# Patient Record
Sex: FEMALE | Race: WHITE | Employment: UNEMPLOYED | URBAN - METROPOLITAN AREA
[De-identification: names, ages, dates, MRNs, and addresses within clinical notes are randomized per-mention and may not be internally consistent; named-entity substitution may affect disease eponyms.]

---
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
## 2024-02-27 LAB
HIV 1+2 AB+HIV1 P24 AG SERPL QL IA: NON REACTIVE
RPR SER QL: NON REACTIVE
TSH SERPL DL<=0.005 MIU/L-ACNC: 18.9 UIU/ML (ref 0.45–4.5)

## 2024-02-27 SDOH — ECONOMIC STABILITY: FOOD INSECURITY: WITHIN THE PAST 12 MONTHS, THE FOOD YOU BOUGHT JUST DIDN'T LAST AND YOU DIDN'T HAVE MONEY TO GET MORE.: NEVER TRUE

## 2024-02-27 SDOH — ECONOMIC STABILITY: FOOD INSECURITY: WITHIN THE PAST 12 MONTHS, YOU WORRIED THAT YOUR FOOD WOULD RUN OUT BEFORE YOU GOT MONEY TO BUY MORE.: NEVER TRUE

## 2024-02-27 SDOH — ECONOMIC STABILITY: INCOME INSECURITY: HOW HARD IS IT FOR YOU TO PAY FOR THE VERY BASICS LIKE FOOD, HOUSING, MEDICAL CARE, AND HEATING?: NOT HARD AT ALL

## 2024-02-27 ASSESSMENT — PATIENT HEALTH QUESTIONNAIRE - PHQ9
SUM OF ALL RESPONSES TO PHQ QUESTIONS 1-9: 0
SUM OF ALL RESPONSES TO PHQ QUESTIONS 1-9: 0
2. FEELING DOWN, DEPRESSED OR HOPELESS: 0
SUM OF ALL RESPONSES TO PHQ9 QUESTIONS 1 & 2: 0
1. LITTLE INTEREST OR PLEASURE IN DOING THINGS: 0
SUM OF ALL RESPONSES TO PHQ QUESTIONS 1-9: 0

## 2024-02-27 NOTE — PROGRESS NOTES
Chief Complaint   Patient presents with    Follow-up     1. Have you been to the ER, urgent care clinic since your last visit?  Hospitalized since your last visit?No    2. Have you seen or consulted any other health care providers outside of the [facility] since your last visit?  Include any pap smears or colon screening. No

## 2024-02-28 ENCOUNTER — TELEMEDICINE (OUTPATIENT)
Facility: CLINIC | Age: 19
End: 2024-02-28
Payer: COMMERCIAL

## 2024-02-28 DIAGNOSIS — T75.3XXA CAR SICKNESS, INITIAL ENCOUNTER: ICD-10-CM

## 2024-02-28 DIAGNOSIS — E89.0 POSTPROCEDURAL HYPOTHYROIDISM: ICD-10-CM

## 2024-02-28 DIAGNOSIS — F41.8 OTHER SPECIFIED ANXIETY DISORDERS: ICD-10-CM

## 2024-02-28 DIAGNOSIS — E89.0 POSTPROCEDURAL HYPOTHYROIDISM: Primary | ICD-10-CM

## 2024-02-28 DIAGNOSIS — Z91.018 FOOD ALLERGY: ICD-10-CM

## 2024-02-28 LAB
HAV IGM SERPL QL IA: NEGATIVE
HBV CORE IGM SERPL QL IA: NEGATIVE
HBV SURFACE AG SERPL QL IA: NEGATIVE
HCV AB SERPL QL IA: NORMAL
HCV IGG SERPL QL IA: NON REACTIVE

## 2024-02-28 PROCEDURE — 99214 OFFICE O/P EST MOD 30 MIN: CPT | Performed by: FAMILY MEDICINE

## 2024-02-28 RX ORDER — LEVOTHYROXINE SODIUM 137 UG/1
125 TABLET ORAL
Qty: 90 TABLET | Refills: 1 | Status: SHIPPED | OUTPATIENT
Start: 2024-02-28

## 2024-02-28 RX ORDER — ONDANSETRON 4 MG/1
4 TABLET, ORALLY DISINTEGRATING ORAL 3 TIMES DAILY PRN
Qty: 21 TABLET | Refills: 0 | Status: SHIPPED | OUTPATIENT
Start: 2024-02-28

## 2024-02-28 RX ORDER — MECLIZINE HYDROCHLORIDE 25 MG/1
25 TABLET ORAL 3 TIMES DAILY PRN
Qty: 30 TABLET | Refills: 3 | Status: SHIPPED | OUTPATIENT
Start: 2024-02-28

## 2024-02-28 RX ORDER — ESCITALOPRAM OXALATE 10 MG/1
10 TABLET ORAL DAILY
Qty: 90 TABLET | Refills: 3 | Status: SHIPPED | OUTPATIENT
Start: 2024-02-28

## 2024-02-28 NOTE — PATIENT INSTRUCTIONS
At this time patient has 112mcg at home of LT4  Not consistent with taking medication  She tells me she can't always remember to take it  *set reminder    For now:  Start by taking 2 on Sunday and 1 on all other days Monday through Saturday    Recheck your thyroid test in about 1 mo      MECLIZINE: take for car sickness  Might cause sedation    ZOFRAN take for nausea/vomiting re/to car sickness    Recheck thyroid in about 25 days

## 2024-02-28 NOTE — PROGRESS NOTES
Roxi Rios is a 18 y.o. female who was seen by synchronous (real-time) audio-video technology on 2/28/2024.      Consent: Roxi Rios, who was seen by synchronous (real-time) audio-video technology, and/or her healthcare decision maker, is aware that this patient-initiated, Telehealth encounter on 2/28/2024 is a billable service, with coverage as determined by her insurance carrier. She is aware that she may receive a bill and has provided verbal consent to proceed: Yes.    Assessment & Plan:      Diagnosis Orders   1. Postprocedural hypothyroidism  levothyroxine (SYNTHROID) 137 MCG tablet    TSH      2. Other specified anxiety disorders  escitalopram (LEXAPRO) 10 MG tablet      3. Car sickness, initial encounter        4. Food allergy  Amb External Referral To Allergy        Patient Instructions   At this time patient has 112mcg at home of LT4  Not consistent with taking medication  She tells me she can't always remember to take it  *set reminder    For now:  Start by taking 2 on Sunday and 1 on all other days Monday through Saturday    Recheck your thyroid test in about 1 mo      MECLIZINE: take for car sickness  Might cause sedation    ZOFRAN take for nausea/vomiting re/to car sickness    Recheck thyroid in about 25 days          Pt was counseled on risks, benefits and alternatives of treatment options. All questions were asked and answered and the patient was agreeable with the treatment plan as outlined.      Subjective:   Roxi Rios is a 18 y.o. female who was seen for Follow-up      THYROID:   Not taking thyroid medication consistently  Forgetting to take it she tells me    Car sickness--better if she is driving  She is not pregnant (on period now)    Having some headaches  Having some fruit allergies--getting itchy mouth        Medications, allergies, PMH, PSH, SOCH, FAMH reviewed and updated per routine protocol, see chart for review and changes if not noted here.    Review of Systems    A

## 2024-04-11 DIAGNOSIS — E89.0 POSTPROCEDURAL HYPOTHYROIDISM: ICD-10-CM

## 2024-04-12 DIAGNOSIS — E89.0 POSTPROCEDURAL HYPOTHYROIDISM: ICD-10-CM

## 2024-04-12 DIAGNOSIS — E89.0 POSTPROCEDURAL HYPOTHYROIDISM: Primary | ICD-10-CM

## 2024-04-12 RX ORDER — LEVOTHYROXINE SODIUM 137 UG/1
125 TABLET ORAL
Qty: 30 TABLET | Refills: 0 | Status: SHIPPED | OUTPATIENT
Start: 2024-04-12

## 2024-06-12 ENCOUNTER — OFFICE VISIT (OUTPATIENT)
Facility: CLINIC | Age: 19
End: 2024-06-12
Payer: COMMERCIAL

## 2024-06-12 VITALS
HEIGHT: 64 IN | TEMPERATURE: 98.2 F | BODY MASS INDEX: 23.15 KG/M2 | DIASTOLIC BLOOD PRESSURE: 67 MMHG | SYSTOLIC BLOOD PRESSURE: 97 MMHG | WEIGHT: 135.6 LBS | OXYGEN SATURATION: 97 % | RESPIRATION RATE: 16 BRPM | HEART RATE: 64 BPM

## 2024-06-12 DIAGNOSIS — Z11.3 SCREENING EXAMINATION FOR VENEREAL DISEASE: ICD-10-CM

## 2024-06-12 DIAGNOSIS — F39 MOOD DISORDER (HCC): ICD-10-CM

## 2024-06-12 DIAGNOSIS — Z00.01 ENCOUNTER FOR GENERAL ADULT MEDICAL EXAMINATION WITH ABNORMAL FINDINGS: Primary | ICD-10-CM

## 2024-06-12 DIAGNOSIS — F33.0 MDD (MAJOR DEPRESSIVE DISORDER), RECURRENT EPISODE, MILD (HCC): ICD-10-CM

## 2024-06-12 DIAGNOSIS — E89.0 POSTPROCEDURAL HYPOTHYROIDISM: ICD-10-CM

## 2024-06-12 DIAGNOSIS — F41.8 OTHER SPECIFIED ANXIETY DISORDERS: ICD-10-CM

## 2024-06-12 PROCEDURE — 99395 PREV VISIT EST AGE 18-39: CPT | Performed by: FAMILY MEDICINE

## 2024-06-12 PROCEDURE — 99213 OFFICE O/P EST LOW 20 MIN: CPT | Performed by: FAMILY MEDICINE

## 2024-06-12 RX ORDER — ESCITALOPRAM OXALATE 10 MG/1
10 TABLET ORAL DAILY
Qty: 90 TABLET | Refills: 3 | Status: SHIPPED | OUTPATIENT
Start: 2024-06-12

## 2024-06-12 SDOH — HEALTH STABILITY: PHYSICAL HEALTH: ON AVERAGE, HOW MANY MINUTES DO YOU ENGAGE IN EXERCISE AT THIS LEVEL?: 10 MIN

## 2024-06-12 SDOH — HEALTH STABILITY: PHYSICAL HEALTH: ON AVERAGE, HOW MANY DAYS PER WEEK DO YOU ENGAGE IN MODERATE TO STRENUOUS EXERCISE (LIKE A BRISK WALK)?: 2 DAYS

## 2024-06-12 ASSESSMENT — PATIENT HEALTH QUESTIONNAIRE - PHQ9
2. FEELING DOWN, DEPRESSED OR HOPELESS: MORE THAN HALF THE DAYS
7. TROUBLE CONCENTRATING ON THINGS, SUCH AS READING THE NEWSPAPER OR WATCHING TELEVISION: SEVERAL DAYS
3. TROUBLE FALLING OR STAYING ASLEEP: NEARLY EVERY DAY
8. MOVING OR SPEAKING SO SLOWLY THAT OTHER PEOPLE COULD HAVE NOTICED. OR THE OPPOSITE, BEING SO FIGETY OR RESTLESS THAT YOU HAVE BEEN MOVING AROUND A LOT MORE THAN USUAL: NOT AT ALL
9. THOUGHTS THAT YOU WOULD BE BETTER OFF DEAD, OR OF HURTING YOURSELF: SEVERAL DAYS
SUM OF ALL RESPONSES TO PHQ QUESTIONS 1-9: 17
1. LITTLE INTEREST OR PLEASURE IN DOING THINGS: NEARLY EVERY DAY
SUM OF ALL RESPONSES TO PHQ QUESTIONS 1-9: 18
SUM OF ALL RESPONSES TO PHQ QUESTIONS 1-9: 18
4. FEELING TIRED OR HAVING LITTLE ENERGY: NEARLY EVERY DAY
SUM OF ALL RESPONSES TO PHQ QUESTIONS 1-9: 18
6. FEELING BAD ABOUT YOURSELF - OR THAT YOU ARE A FAILURE OR HAVE LET YOURSELF OR YOUR FAMILY DOWN: MORE THAN HALF THE DAYS
SUM OF ALL RESPONSES TO PHQ9 QUESTIONS 1 & 2: 5

## 2024-06-12 ASSESSMENT — COLUMBIA-SUICIDE SEVERITY RATING SCALE - C-SSRS
2. HAVE YOU ACTUALLY HAD ANY THOUGHTS OF KILLING YOURSELF?: NO
1. WITHIN THE PAST MONTH, HAVE YOU WISHED YOU WERE DEAD OR WISHED YOU COULD GO TO SLEEP AND NOT WAKE UP?: YES
6. HAVE YOU EVER DONE ANYTHING, STARTED TO DO ANYTHING, OR PREPARED TO DO ANYTHING TO END YOUR LIFE?: NO

## 2024-06-12 NOTE — PATIENT INSTRUCTIONS
The 24/7 [place] [facility] Crisis line (458-932-0422) is available to assist with mental health emergencies occurring in the [facility]. If you, or someone you know, is having thoughts of suicide, feelings of hopelessness or experiencing any other psychiatric emergency, please call! They can assist in discussing ways to stay safe and/or can link individuals to outpatient treatment, crisis stabilization programs, or psychiatric hospitalization as needed.   All crisis calls are free. If you have a mental health emergency, please call [phone].   Regular client appointments and intakes for new clients are being conducted by telephone or through tele-health options. Individuals wishing to begin outpatient counseling services through [facility] can the Intake number at [phone].  -----------  Suicide Prevention Lifeline: (206) 273-TALK or (472) SUICIDE  ?  Mental Health [facility]: 511-672-RPRI 6428) - peer run M-F (non-crisis)    -----------    COUNSELOR OPTIONS:  [facility]- http://Marseille NetworksbeRed Ambiental.Focus IP/  860-497-4326    [facility] - https://www.Anago/  [phone]     [facility] - https://familyguidancecenters.com/services/  [phone]    [facility] - https://www.IndiaIdeas/  671.576.8686    [facility] psychiatry (*Slovak-speaking psychiatrist also) - https://www.Con[employer] Systemspsychiatric.com/   [phone]    [facility] - https://AltSchool/  [phone]    [facility] - https://[employer]/  803-295-9944    [facility]  [phone]    [facility]  [phone]

## 2024-06-12 NOTE — PROGRESS NOTES
Chief Complaint   Patient presents with    Annual Exam     Roxi Rios is a 19 y.o. female who presents for his/her annual physical examination. Their most recent physical exam was done over 1 yr ago.      \"Have you been to the ER, urgent care clinic since your last visit?  Hospitalized since your last visit?\"    NO    “Have you seen or consulted any other health care providers outside of [facility] since your last visit?”    NO             
in discussing ways to stay safe and/or can link individuals to outpatient treatment, crisis stabilization programs, or psychiatric hospitalization as needed.   All crisis calls are free. If you have a mental health emergency, please call [phone].   Regular client appointments and intakes for new clients are being conducted by telephone or through tele-health options. Individuals wishing to begin outpatient counseling services through [facility] can the Intake number at [phone].  -----------  Suicide Prevention Lifeline: (272) 273-TALK or (972) SUICIDE  ?  Mental Health [facility]: 692-582-TWVF (4209) - peer run M-F (non-crisis)    -----------    COUNSELOR OPTIONS:  [facility]- http://balancebehavioral.com/  870-598-7228    [facility] - https://wwwDreamLines/  [phone]     [facility] - https://familyguidancecenters.com/services/  [phone]    [facility] - https://www.Croak.it/  989-100-9000    [facility] psychiatry (*[place] Islander-speaking psychiatrist also) - https://www.Knox [employer] HubiatricToma Biosciences/   [phone]    [facility] - https://Shoppable/  [phone]    [facility] behavioral health - https://Edventures/  [phone]    [facility]  [phone]    [facility]  [phone]        I have discussed the diagnosis with the patient and the intended plan as seen in the above orders.  The patient understands and agrees with the plan. The patient has received an after-visit summary and questions were answered concerning future plans.     Medication Side Effects and Warnings were discussed with patient  Patient Labs were reviewed and or requested:  Patient Past Records were reviewed and or requested    Please note that this dictation was completed with [employer], the Raptr voice recognition software.  Quite often unanticipated grammatical, syntax, homophones, and other interpretive errors are inadvertently transcribed by

## 2024-07-10 ENCOUNTER — TELEMEDICINE (OUTPATIENT)
Facility: CLINIC | Age: 19
End: 2024-07-10
Payer: COMMERCIAL

## 2024-07-10 DIAGNOSIS — F39 MOOD DISORDER (HCC): Primary | ICD-10-CM

## 2024-07-10 DIAGNOSIS — E89.0 POSTPROCEDURAL HYPOTHYROIDISM: ICD-10-CM

## 2024-07-10 DIAGNOSIS — F33.0 MDD (MAJOR DEPRESSIVE DISORDER), RECURRENT EPISODE, MILD (HCC): ICD-10-CM

## 2024-07-10 PROCEDURE — 99213 OFFICE O/P EST LOW 20 MIN: CPT | Performed by: FAMILY MEDICINE

## 2024-07-10 NOTE — PATIENT INSTRUCTIONS
The 24/7 [place] [facility] Crisis line (681-891-7612) is available to assist with mental health emergencies occurring in the [facility]. If you, or someone you know, is having thoughts of suicide, feelings of hopelessness or experiencing any other psychiatric emergency, please call! They can assist in discussing ways to stay safe and/or can link individuals to outpatient treatment, crisis stabilization programs, or psychiatric hospitalization as needed.   All crisis calls are free. If you have a mental health emergency, please call [phone].   Regular client appointments and intakes for new clients are being conducted by telephone or through tele-health options. Individuals wishing to begin outpatient counseling services through [facility] can the Intake number at [phone].  -----------    Crisis Response and Stabilization Team (CReST) Referral Line [phone]  CReST is a resource for children ages 5 - 18 or adults ages 18 & older who are in crisis and need help avoiding psychiatric hospitalization.  With quick response and assistance connecting to on-going services, CReST works to reduce the cycles of crises and prevent the need for more intense care.    -----------    247 REACH CRISIS & REFERRAL LINE - 1-547.349.9716  REACH is a program to support individuals with developmental disabilities who are at risk of crisis due to challenging behavioral health needs which are negatively affecting their quality of life.    -----------    Teddy Project Hotline: 1-197.709.9021  The Teddy Project is the leading national organization providing crisis intervention and suicide prevention services to lesbian, tobias, bisexual, transgender, queer & questioning (LGBTQ) young people under 25.  ?  ASIYA: Text “ASIYA” to 962029 if you are having suicidal thoughts or urges. You can reach out by text.  ?  Suicide Prevention Lifeline: (368) 273-TALK or (416) SUICIDE  ?  Mental Health [facility]: 012-599-YMOF (8947) - peer

## 2024-07-10 NOTE — PROGRESS NOTES
Chief Complaint   Patient presents with    Anxiety     Roxi Rios is a 19 y.o. female who presents for a follow up on anxiety.    Patient was recently started on vraylar. She reports good compliance with med and denies any side effects.     \"Have you been to the ER, urgent care clinic since your last visit?  Hospitalized since your last visit?\"    NO    “Have you seen or consulted any other health care providers outside of [facility] since your last visit?”    NO             
Telephone Call RE:  Appointment reminder     Outcome:     [x] Patient confirmed appointment   [] Patient rescheduled appointment for    [] Unable to reach   [] Left message              [] Other:       Andrew Carlson
Abnormal -     Neurological:        [x] No Facial Asymmetry (Cranial nerve 7 motor function) (limited exam due to video visit)          [x] No gaze palsy        [] Abnormal -          Skin:        [x] No significant exanthematous lesions or discoloration noted on facial skin         [] Abnormal -            Psychiatric:       [x] Normal Affect [] Abnormal -        [x] No Hallucinations    Other pertinent observable physical exam findings:-    Results         I have discussed the diagnosis with the patient and the intended plan as seen in the above orders.  The patient understands and agrees with the plan.      Medication Side Effects and Warnings were discussed with patient  Patient Labs were reviewed and or requested:  Patient Past Records were reviewed and or requested    On this date 07/10/24 I have spent 15 minutes reviewing previous notes, test results and face to face (virtual) with the patient discussing the diagnosis and importance of compliance with the treatment plan as well as documenting on the day of the visit.    --ALIZA Ball - [facility]  [phone]

## 2024-09-12 ENCOUNTER — TELEMEDICINE (OUTPATIENT)
Facility: CLINIC | Age: 19
End: 2024-09-12
Payer: COMMERCIAL

## 2024-09-12 DIAGNOSIS — F33.0 MDD (MAJOR DEPRESSIVE DISORDER), RECURRENT EPISODE, MILD (HCC): ICD-10-CM

## 2024-09-12 DIAGNOSIS — F39 MOOD DISORDER (HCC): ICD-10-CM

## 2024-09-12 PROCEDURE — 99213 OFFICE O/P EST LOW 20 MIN: CPT | Performed by: FAMILY MEDICINE

## 2024-09-12 ASSESSMENT — PATIENT HEALTH QUESTIONNAIRE - PHQ9
SUM OF ALL RESPONSES TO PHQ QUESTIONS 1-9: 19
1. LITTLE INTEREST OR PLEASURE IN DOING THINGS: NEARLY EVERY DAY
SUM OF ALL RESPONSES TO PHQ9 QUESTIONS 1 & 2: 6
4. FEELING TIRED OR HAVING LITTLE ENERGY: NEARLY EVERY DAY
10. IF YOU CHECKED OFF ANY PROBLEMS, HOW DIFFICULT HAVE THESE PROBLEMS MADE IT FOR YOU TO DO YOUR WORK, TAKE CARE OF THINGS AT HOME, OR GET ALONG WITH OTHER PEOPLE: VERY DIFFICULT
6. FEELING BAD ABOUT YOURSELF - OR THAT YOU ARE A FAILURE OR HAVE LET YOURSELF OR YOUR FAMILY DOWN: NEARLY EVERY DAY
7. TROUBLE CONCENTRATING ON THINGS, SUCH AS READING THE NEWSPAPER OR WATCHING TELEVISION: SEVERAL DAYS
3. TROUBLE FALLING OR STAYING ASLEEP: SEVERAL DAYS
SUM OF ALL RESPONSES TO PHQ QUESTIONS 1-9: 16
9. THOUGHTS THAT YOU WOULD BE BETTER OFF DEAD, OR OF HURTING YOURSELF: NEARLY EVERY DAY
SUM OF ALL RESPONSES TO PHQ QUESTIONS 1-9: 19
SUM OF ALL RESPONSES TO PHQ QUESTIONS 1-9: 19
2. FEELING DOWN, DEPRESSED OR HOPELESS: NEARLY EVERY DAY
5. POOR APPETITE OR OVEREATING: SEVERAL DAYS
8. MOVING OR SPEAKING SO SLOWLY THAT OTHER PEOPLE COULD HAVE NOTICED. OR THE OPPOSITE, BEING SO FIGETY OR RESTLESS THAT YOU HAVE BEEN MOVING AROUND A LOT MORE THAN USUAL: SEVERAL DAYS

## 2024-09-12 ASSESSMENT — COLUMBIA-SUICIDE SEVERITY RATING SCALE - C-SSRS
4. HAVE YOU HAD THESE THOUGHTS AND HAD SOME INTENTION OF ACTING ON THEM?: NO
2. HAVE YOU ACTUALLY HAD ANY THOUGHTS OF KILLING YOURSELF?: YES
1. WITHIN THE PAST MONTH, HAVE YOU WISHED YOU WERE DEAD OR WISHED YOU COULD GO TO SLEEP AND NOT WAKE UP?: YES
3. HAVE YOU BEEN THINKING ABOUT HOW YOU MIGHT KILL YOURSELF?: NO
5. HAVE YOU STARTED TO WORK OUT OR WORKED OUT THE DETAILS OF HOW TO KILL YOURSELF? DO YOU INTEND TO CARRY OUT THIS PLAN?: NO
6. HAVE YOU EVER DONE ANYTHING, STARTED TO DO ANYTHING, OR PREPARED TO DO ANYTHING TO END YOUR LIFE?: NO

## 2024-10-01 ENCOUNTER — OFFICE VISIT (OUTPATIENT)
Age: 19
End: 2024-10-01
Payer: COMMERCIAL

## 2024-10-01 ENCOUNTER — TELEPHONE (OUTPATIENT)
Age: 19
End: 2024-10-01

## 2024-10-01 VITALS — WEIGHT: 135 LBS | HEIGHT: 64 IN | BODY MASS INDEX: 23.05 KG/M2

## 2024-10-01 DIAGNOSIS — N63.21 MASS OF UPPER OUTER QUADRANT OF LEFT BREAST: Primary | ICD-10-CM

## 2024-10-01 PROCEDURE — 99213 OFFICE O/P EST LOW 20 MIN: CPT | Performed by: SURGERY

## 2024-10-01 NOTE — H&P (VIEW-ONLY)
HISTORY OF PRESENT ILLNESS  Roxi Rios is a 19 y.o. female     HPI ESTABLISHED Patient here for LEFT breast lump follow up. Feels like the area has increased in size and is uncomfortable when pressure is applied to the area. Denies other changes to the breast.   Here with her mother    LOV- 6/2022 LEFT breast lump biopsy, Fibroadenoma     Pt had a thyroidectomy 4/2022. She would like to have the breast mass biospied.     Past Medical History:   Diagnosis Date    Anxiety     Depression     Thyroid nodule      Past Surgical History:   Procedure Laterality Date    THYROIDECTOMY Right 04/18/2022     Total thyroidectomy with right central neck lymph node dissection and intraoperative laryngeal nerve monitoring    TONSILLECTOMY        OB History    No obstetric history on file.       Family History   Problem Relation Age of Onset    Depression Maternal Grandmother     Stroke Maternal Grandmother     Hypertension Maternal Grandmother     Heart Disease Maternal Grandmother     No Known Problems Father     Depression Mother      Social History     Tobacco Use    Smoking status: Never     Passive exposure: Never    Smokeless tobacco: Never   Substance Use Topics    Alcohol use: No      Prior to Admission medications    Medication Sig Start Date End Date Taking? Authorizing Provider   cariprazine hcl (VRAYLAR) 1.5 MG capsule Take 1 capsule by mouth daily 9/12/24  Yes Torrie Mcdowell APRN - CNP   escitalopram (LEXAPRO) 10 MG tablet Take 1 tablet by mouth daily 6/12/24  Yes Torrie Mcdowell APRN - CNP   levothyroxine (SYNTHROID) 137 MCG tablet Take 1 tablet by mouth every morning (before breakfast) 4/12/24  Yes Torrie Mcdowell APRN - CNP   Calcium Carbonate-Vitamin D 600-3.125 MG-MCG TABS Take 2 tablets by mouth 3 times daily  Patient not taking: Reported on 5/25/2023    Automatic Reconciliation, Ar      No Known Allergies           Review of Systems      Physical Exam  Cardiovascular:      Rate and Rhythm: Normal rate and

## 2024-10-04 ENCOUNTER — TELEPHONE (OUTPATIENT)
Age: 19
End: 2024-10-04

## 2024-10-04 ENCOUNTER — TELEMEDICINE (OUTPATIENT)
Facility: CLINIC | Age: 19
End: 2024-10-04
Payer: COMMERCIAL

## 2024-10-04 DIAGNOSIS — F41.8 OTHER SPECIFIED ANXIETY DISORDERS: ICD-10-CM

## 2024-10-04 DIAGNOSIS — F39 MOOD DISORDER (HCC): Primary | ICD-10-CM

## 2024-10-04 DIAGNOSIS — E89.0 POSTPROCEDURAL HYPOTHYROIDISM: ICD-10-CM

## 2024-10-04 DIAGNOSIS — F33.0 MDD (MAJOR DEPRESSIVE DISORDER), RECURRENT EPISODE, MILD (HCC): ICD-10-CM

## 2024-10-04 PROCEDURE — 99213 OFFICE O/P EST LOW 20 MIN: CPT | Performed by: FAMILY MEDICINE

## 2024-10-04 RX ORDER — ESCITALOPRAM OXALATE 10 MG/1
15 TABLET ORAL DAILY
Qty: 90 TABLET | Refills: 3 | Status: SHIPPED
Start: 2024-10-04

## 2024-10-04 NOTE — PROGRESS NOTES
Chief Complaint   Patient presents with    Follow-up     4 week fu for mood check.  Pt has no new concern s for pcp today.  Mood has been good.  Gen health is good.         
medication is fully in her system.    Orders:    TSH; Future    Basic Metabolic Panel; Future      Return in about 6 weeks (around 11/15/2024) for Depression/Anxiety, Hypothyroid.    Patient Instructions   The 24/7 [facility] Crisis line (914-964-4345) is available to assist with mental health emergencies occurring in the [facility]. If you, or someone you know, is having thoughts of suicide, feelings of hopelessness or experiencing any other psychiatric emergency, please call! They can assist in discussing ways to stay safe and/or can link individuals to outpatient treatment, crisis stabilization programs, or psychiatric hospitalization as needed.   All crisis calls are free. If you have a mental health emergency, please call [phone].   Regular client appointments and intakes for new clients are being conducted by telephone or through tele-health options. Individuals wishing to begin outpatient counseling services through [facility] can the Intake number at [phone].          SUBJECTIVE:  History of Present Illness  Roxi Rios is a 19 y.o. female who presents today for a virtual visit to discuss depression.    She is currently on a daily regimen of Lexapro, which was recently increased to 15 mg by her psychiatrist, Carmenza Pedraza (sp?). She has an upcoming appointment with psych on 11/06. Despite the increase in medication, she continues to struggle with feelings of depression. However, she reports that the medication does provide some relief. She also mentions that she has an adequate supply of her medications.  She reports feeling better than prior appointment.  She has stopped Vraylar due to concern for side effects.  She denies any SI/HI.  She reports her mother continues to strong support system.    She is also trying to take thyroid medication with compliance.  She is due for recheck.       As per HPI, otherwise comprehensive ROS unremarkable.     No Known Allergies  Past Medical History:

## 2024-10-04 NOTE — PATIENT INSTRUCTIONS
The 24/7 [place] [facility] Crisis line ([phone]) is available to assist with mental health emergencies occurring in the [facility]. If you, or someone you know, is having thoughts of suicide, feelings of hopelessness or experiencing any other psychiatric emergency, please call! They can assist in discussing ways to stay safe and/or can link individuals to outpatient treatment, crisis stabilization programs, or psychiatric hospitalization as needed.   All crisis calls are free. If you have a mental health emergency, please call [phone].   Regular client appointments and intakes for new clients are being conducted by telephone or through tele-health options. Individuals wishing to begin outpatient counseling services through [facility] can the Intake number at [phone].

## 2024-10-04 NOTE — TELEPHONE ENCOUNTER
I called and left a message for the patient to call when she is ready to schedule her surgery. I let her know that Dr Krishnan has openings on th e16th and the 23rd of this month.

## 2024-10-07 ENCOUNTER — TELEPHONE (OUTPATIENT)
Age: 19
End: 2024-10-07

## 2024-10-07 ENCOUNTER — PREP FOR PROCEDURE (OUTPATIENT)
Age: 19
End: 2024-10-07

## 2024-10-07 PROBLEM — N63.20 LEFT BREAST MASS: Status: ACTIVE | Noted: 2024-10-07

## 2024-10-07 NOTE — TELEPHONE ENCOUNTER
Patient Surgery Information Sheet      Patient Name:  Roxi Rios  Surgery Date:  October 16, 2024    Type of Surgery:  LEFT BREAST EXCISIONAL BIOPSY    Estimated arrival time 10:30AM    Arrival time will be confirmed the afternoon before your surgery.    Pre-procedure: Not Applicable    Pre-Operative Testing Department will call to schedule pre-op testing appointment if needed before surgery    Hospital:  [facility]  Address:  [address] [place] [address]. [place] [id number]  Check in location:  Through the Main Entrance of [place], Take the elevator on the left side to the second floor on your left as you step out of the elevator    Correction: take a RIGHT off the elevator once  you are on the second floor.    Pre-Operative Instructions:  Will be given at the pre-op appointment.    Special Instructions if needed:     NPO (nothing by mouth) or drinking after midnight the night before Surgery  Patient may shower the morning of, do not use an lotion, deodorant, powders, perfumes or makeup  Patient will need  the morning of surgery     Surgery Scheduler:  Huong Perez

## 2024-10-13 ENCOUNTER — ANESTHESIA EVENT (OUTPATIENT)
Facility: HOSPITAL | Age: 19
End: 2024-10-13
Payer: COMMERCIAL

## 2024-10-13 RX ORDER — FENTANYL CITRATE 50 UG/ML
100 INJECTION, SOLUTION INTRAMUSCULAR; INTRAVENOUS
Status: CANCELLED | OUTPATIENT
Start: 2024-10-13 | End: 2024-10-14

## 2024-10-13 RX ORDER — SODIUM CHLORIDE, SODIUM LACTATE, POTASSIUM CHLORIDE, CALCIUM CHLORIDE 600; 310; 30; 20 MG/100ML; MG/100ML; MG/100ML; MG/100ML
INJECTION, SOLUTION INTRAVENOUS CONTINUOUS
Status: CANCELLED | OUTPATIENT
Start: 2024-10-13

## 2024-10-13 RX ORDER — MIDAZOLAM HYDROCHLORIDE 2 MG/2ML
2 INJECTION, SOLUTION INTRAMUSCULAR; INTRAVENOUS
Status: CANCELLED | OUTPATIENT
Start: 2024-10-13 | End: 2024-10-14

## 2024-10-13 RX ORDER — LIDOCAINE HYDROCHLORIDE 10 MG/ML
1 INJECTION, SOLUTION EPIDURAL; INFILTRATION; INTRACAUDAL; PERINEURAL
Status: CANCELLED | OUTPATIENT
Start: 2024-10-13 | End: 2024-10-14

## 2024-10-15 NOTE — PERIOP NOTE
[facility]                   [address] [place] [address]   PRE-ADMISSION TESTING    [phone]     Surgery Date:  Wednesday, October 16, 2024       Is surgery arrival time given by surgeon?  YES  NO  If “NO”, [place] staff will call you between 3 and 7pm the day before your surgery with your arrival time. (If your surgery is on a Monday, we will call you the Friday before.)    Call [phone] after 7pm Monday-Friday if you did not receive this call.    INSTRUCTIONS BEFORE YOUR SURGERY   When You  Arrive Arrive at the 2nd Floor Admitting Desk on the day of your surgery  Have your insurance card, photo ID, and any copayment (if needed)   Food   and   Drink No food or drink (gum, mints, coffee, juice, etc) after midnight the night before surgery.        You may drink WATER ONLY up until 2 hours prior to your surgery time. Please do not       add anything to your water as this may result in your surgery being postponed.     No alcohol (beer, wine, liquor) 24 hours before and after surgery   Medications to   TAKE   Morning of Surgery MEDICATIONS TO TAKE THE MORNING OF SURGERY WITH A SIP OF WATER:     Levothyroxine  Lexapro   Medications  To  STOP      7 days before surgery Non-Steroidal anti-inflammatory Drugs (NSAID's): for example, Ibuprofen (Advil, Motrin), Naproxen (Aleve)  Aspirin, if taking for pain   Herbal supplements, vitamins, and fish oil  (Pain medications not listed above, including Tylenol may be taken)   Blood  Thinners N/A   Bathing Clothing  Jewelry  Valuables     Shower the morning of surgery, please do not apply anything to your skin (lotions, powders, deodorant, or makeup, especially mascara)  Do not shave or trim anywhere 24 hours before surgery  Wear your hair loose or down; no pony-tails, buns, or metal hair clips  Wear loose, comfortable, clean clothes  Wear glasses instead of contacts  Leave money, valuables, and jewelry, including body piercings, at

## 2024-10-16 ENCOUNTER — ANESTHESIA (OUTPATIENT)
Facility: HOSPITAL | Age: 19
End: 2024-10-16
Payer: COMMERCIAL

## 2024-10-16 ENCOUNTER — HOSPITAL ENCOUNTER (OUTPATIENT)
Facility: HOSPITAL | Age: 19
Setting detail: OUTPATIENT SURGERY
Discharge: HOME | End: 2024-10-16
Attending: SURGERY | Admitting: SURGERY
Payer: COMMERCIAL

## 2024-10-16 VITALS
HEIGHT: 64 IN | RESPIRATION RATE: 18 BRPM | DIASTOLIC BLOOD PRESSURE: 55 MMHG | BODY MASS INDEX: 22.88 KG/M2 | WEIGHT: 134.04 LBS | SYSTOLIC BLOOD PRESSURE: 91 MMHG | OXYGEN SATURATION: 95 % | HEART RATE: 72 BPM | TEMPERATURE: 97.7 F

## 2024-10-16 LAB — HCG UR QL: NEGATIVE

## 2024-10-16 PROCEDURE — 7100000000 HC PACU RECOVERY - FIRST 15 MIN: Performed by: SURGERY

## 2024-10-16 PROCEDURE — 2709999900 HC NON-CHARGEABLE SUPPLY: Performed by: SURGERY

## 2024-10-16 PROCEDURE — 2500000003 HC RX 250 WO HCPCS

## 2024-10-16 PROCEDURE — 7100000011 HC PHASE II RECOVERY - ADDTL 15 MIN: Performed by: SURGERY

## 2024-10-16 PROCEDURE — 3600000003 HC SURGERY LEVEL 3 BASE: Performed by: SURGERY

## 2024-10-16 PROCEDURE — 7100000001 HC PACU RECOVERY - ADDTL 15 MIN: Performed by: SURGERY

## 2024-10-16 PROCEDURE — 6360000002 HC RX W HCPCS

## 2024-10-16 PROCEDURE — 3600000013 HC SURGERY LEVEL 3 ADDTL 15MIN: Performed by: SURGERY

## 2024-10-16 PROCEDURE — 3700000001 HC ADD 15 MINUTES (ANESTHESIA): Performed by: SURGERY

## 2024-10-16 PROCEDURE — 2500000003 HC RX 250 WO HCPCS: Performed by: SURGERY

## 2024-10-16 PROCEDURE — 81025 URINE PREGNANCY TEST: CPT

## 2024-10-16 PROCEDURE — 7100000010 HC PHASE II RECOVERY - FIRST 15 MIN: Performed by: SURGERY

## 2024-10-16 PROCEDURE — 3700000000 HC ANESTHESIA ATTENDED CARE: Performed by: SURGERY

## 2024-10-16 PROCEDURE — 2580000003 HC RX 258

## 2024-10-16 PROCEDURE — 88307 TISSUE EXAM BY PATHOLOGIST: CPT

## 2024-10-16 RX ORDER — SODIUM CHLORIDE, SODIUM LACTATE, POTASSIUM CHLORIDE, CALCIUM CHLORIDE 600; 310; 30; 20 MG/100ML; MG/100ML; MG/100ML; MG/100ML
INJECTION, SOLUTION INTRAVENOUS
Status: DISCONTINUED | OUTPATIENT
Start: 2024-10-16 | End: 2024-10-16 | Stop reason: SDUPTHER

## 2024-10-16 RX ORDER — NALOXONE HYDROCHLORIDE 0.4 MG/ML
INJECTION, SOLUTION INTRAMUSCULAR; INTRAVENOUS; SUBCUTANEOUS PRN
Status: DISCONTINUED | OUTPATIENT
Start: 2024-10-16 | End: 2024-10-16 | Stop reason: HOSPADM

## 2024-10-16 RX ORDER — DEXMEDETOMIDINE HYDROCHLORIDE 100 UG/ML
INJECTION, SOLUTION INTRAVENOUS
Status: DISCONTINUED | OUTPATIENT
Start: 2024-10-16 | End: 2024-10-16 | Stop reason: SDUPTHER

## 2024-10-16 RX ORDER — DIPHENHYDRAMINE HYDROCHLORIDE 50 MG/ML
12.5 INJECTION INTRAMUSCULAR; INTRAVENOUS
Status: DISCONTINUED | OUTPATIENT
Start: 2024-10-16 | End: 2024-10-16 | Stop reason: HOSPADM

## 2024-10-16 RX ORDER — EPHEDRINE SULFATE/0.9% NACL/PF 50 MG/5 ML
SYRINGE (ML) INTRAVENOUS
Status: DISCONTINUED | OUTPATIENT
Start: 2024-10-16 | End: 2024-10-16 | Stop reason: SDUPTHER

## 2024-10-16 RX ORDER — ONDANSETRON 2 MG/ML
INJECTION INTRAMUSCULAR; INTRAVENOUS
Status: DISCONTINUED | OUTPATIENT
Start: 2024-10-16 | End: 2024-10-16 | Stop reason: SDUPTHER

## 2024-10-16 RX ORDER — FENTANYL CITRATE 50 UG/ML
INJECTION, SOLUTION INTRAMUSCULAR; INTRAVENOUS
Status: DISCONTINUED | OUTPATIENT
Start: 2024-10-16 | End: 2024-10-16 | Stop reason: SDUPTHER

## 2024-10-16 RX ORDER — SODIUM CHLORIDE, SODIUM LACTATE, POTASSIUM CHLORIDE, CALCIUM CHLORIDE 600; 310; 30; 20 MG/100ML; MG/100ML; MG/100ML; MG/100ML
INJECTION, SOLUTION INTRAVENOUS CONTINUOUS
Status: DISCONTINUED | OUTPATIENT
Start: 2024-10-16 | End: 2024-10-16 | Stop reason: HOSPADM

## 2024-10-16 RX ORDER — MIDAZOLAM HYDROCHLORIDE 1 MG/ML
INJECTION INTRAMUSCULAR; INTRAVENOUS
Status: DISCONTINUED | OUTPATIENT
Start: 2024-10-16 | End: 2024-10-16 | Stop reason: SDUPTHER

## 2024-10-16 RX ORDER — ONDANSETRON 2 MG/ML
4 INJECTION INTRAMUSCULAR; INTRAVENOUS
Status: DISCONTINUED | OUTPATIENT
Start: 2024-10-16 | End: 2024-10-16 | Stop reason: HOSPADM

## 2024-10-16 RX ORDER — PROPOFOL 10 MG/ML
INJECTION, EMULSION INTRAVENOUS
Status: DISCONTINUED | OUTPATIENT
Start: 2024-10-16 | End: 2024-10-16 | Stop reason: SDUPTHER

## 2024-10-16 RX ADMIN — Medication 10 MG: at 12:34

## 2024-10-16 RX ADMIN — PROPOFOL 20 MG: 10 INJECTION, EMULSION INTRAVENOUS at 12:18

## 2024-10-16 RX ADMIN — PROPOFOL 50 MCG/KG/MIN: 10 INJECTION, EMULSION INTRAVENOUS at 12:19

## 2024-10-16 RX ADMIN — MIDAZOLAM HYDROCHLORIDE 2 MG: 1 INJECTION, SOLUTION INTRAMUSCULAR; INTRAVENOUS at 12:13

## 2024-10-16 RX ADMIN — Medication 10 MG: at 12:22

## 2024-10-16 RX ADMIN — FENTANYL CITRATE 25 MCG: 50 INJECTION, SOLUTION INTRAMUSCULAR; INTRAVENOUS at 12:31

## 2024-10-16 RX ADMIN — DEXMEDETOMIDINE 8 MCG: 100 INJECTION, SOLUTION INTRAVENOUS at 12:13

## 2024-10-16 RX ADMIN — ONDANSETRON 4 MG: 2 INJECTION INTRAMUSCULAR; INTRAVENOUS at 12:30

## 2024-10-16 RX ADMIN — FENTANYL CITRATE 25 MCG: 50 INJECTION, SOLUTION INTRAMUSCULAR; INTRAVENOUS at 12:27

## 2024-10-16 RX ADMIN — SODIUM CHLORIDE, POTASSIUM CHLORIDE, SODIUM LACTATE AND CALCIUM CHLORIDE: 600; 310; 30; 20 INJECTION, SOLUTION INTRAVENOUS at 12:13

## 2024-10-16 RX ADMIN — FENTANYL CITRATE 50 MCG: 50 INJECTION, SOLUTION INTRAMUSCULAR; INTRAVENOUS at 12:18

## 2024-10-16 ASSESSMENT — PAIN SCALES - GENERAL
PAINLEVEL_OUTOF10: 0

## 2024-10-16 ASSESSMENT — PAIN - FUNCTIONAL ASSESSMENT: PAIN_FUNCTIONAL_ASSESSMENT: NONE - DENIES PAIN

## 2024-10-16 NOTE — DISCHARGE INSTRUCTIONS
If you have increasing pain, progressively worsening leg warmth or swelling, STOP the exercise and call your doctor.       These are general instructions for a healthy lifestyle:    *   Please give a list of your current medications to your Primary Care Provider.  *   Please update this list whenever your medications are discontinued, doses are changed, or new medications (including over-the-counter products) are added.  *   Please carry medication information at all times in case of emergency situations.      About Smoking  No smoking / No tobacco products  Avoid exposure to second hand smoke     Surgeon General's Warning:  Quitting smoking now greatly reduces serious risk to your health.    Obesity, smoking, and sedentary lifestyle greatly increases your risk for illness and disease.  A healthy diet, regular physical exercise & weight monitoring are important for maintaining a healthy lifestyle.      Congestive Heart Failure  You may be retaining fluid if you have a history of heart failure or if you experience any of the following symptoms:  Weight gain of 3 pounds or more overnight or 5 pounds in a week, increased swelling in your hands or feet or shortness of breath while lying flat in bed.  Please call your doctor as soon as you notice any of these symptoms; do not wait until your next office visit.      Recognize signs and symptoms of STROKE:  F -  Face looks uneven  A -  Arms unable to move or move evenly  S -  Speech slurred or non-existent  T -  Time-call 911 as soon as signs and symptoms begin-DO NOT go          back to bed or wait to see if you get better-TIME IS BRAIN.      Warning Signs of HEART ATTACK   Call 911 if you have these symptoms:    Chest discomfort. Most heart attacks involve discomfort in the center of the chest that lasts more than a few minutes, or that goes away and comes back. It can feel like uncomfortable pressure, squeezing, fullness, or pain.  Discomfort in other areas of the upper

## 2024-10-16 NOTE — OP NOTE
Operative Note    [facility]  [address] [address] [facility], [address]     Patient: Roxi Rios  YOB: 2005  MRN: [id number]    Date of Procedure: 10/16/2024    Pre-Op Diagnosis Codes:      * Left breast mass [N63.20]    Post-Op Diagnosis: Same       Procedure(s):  LEFT BREAST EXCISIONAL BIOPSY    Surgeon(s):  Lisa Krishnan MD    Assistant:   Surgical Assistant: Tayler Rowland    Anesthesia: Monitor Anesthesia Care    Estimated Blood Loss (mL): Minimal    Complications: None    Specimens:   ID Type Source Tests Collected by Time Destination   1 : Left Breast Fibroadenoma Tissue Breast SURGICAL PATHOLOGY Lisa Krishnan MD 10/16/2024 1236        Implants:  * No implants in log *      Drains: * No LDAs found *    Findings:  Infection Present At Time Of Surgery (PATOS) (choose all levels that have infection present):  No infection present  Other Findings:   This procedure was not performed to treat primary cutaneous melanoma through wide local excision    Detailed Description of Procedure:   Pt was brought into the operating room and placed on the table in a supine position.  She was sedated with IV sedation.  The LEFT breast was prepped and draped in the usual sterile fashion.  O.5% marcaine was used for local anesthesia and post op pain relief.  The LEFT breast mass at 3:00 was palpable.  A 3cm oblique incision was made in the lateral breast.  The mass was removed using blung dissection and electrocautery, oriented with sutures, and sent to pathology.  Hemostasis was controlled with electrocautery.  The breast tissue was re-approximated with 3-0 vicryl sutures.  The dermis was closed with interrupted 3-0 vicryl sutures and the skin was closed with running 4-0 monocryl suture.  The wound was dressed with steristrips.  The patient was awakened and taken to the recovery room.   Sponge, needle and instrument counts were reported to be correct.      Electronically

## 2024-10-16 NOTE — ANESTHESIA PRE PROCEDURE
Department of Anesthesiology  Preprocedure Note       Name:  Roxi Rios   Age:  19 y.o.  :  2005                                          MRN:  [id number]         Date:  10/16/2024      Surgeon: Surgeon(s):  Lisa Krishnan MD    Procedure: Procedure(s):  LEFT BREAST EXCISIONAL BIOPSY    Medications prior to admission:   Prior to Admission medications    Medication Sig Start Date End Date Taking? Authorizing Provider   escitalopram (LEXAPRO) 10 MG tablet Take 1.5 tablets by mouth daily  Patient taking differently: Take 1.5 tablets by mouth Daily with lunch 10/4/24  Yes Torrie Mcdoewll APRN - CNP   levothyroxine (SYNTHROID) 137 MCG tablet Take 1 tablet by mouth every morning (before breakfast) 24  Yes Torrie Mcdowell APRN - CNP       Current medications:    No current facility-administered medications for this encounter.       Allergies:  No Known Allergies    Problem List:    Patient Active Problem List   Diagnosis Code    Thyroid nodule E04.1    Left breast mass N63.20       Past Medical History:        Diagnosis Date    Anxiety and depression     COVID     Thyroid cancer (HCC)        Past Surgical History:        Procedure Laterality Date    THYROIDECTOMY Right 2022     Total thyroidectomy with right central neck lymph node dissection and intraoperative laryngeal nerve monitoring    TONSILLECTOMY         Social History:    Social History     Tobacco Use    Smoking status: Never     Passive exposure: Past    Smokeless tobacco: Never   Substance Use Topics    Alcohol use: No                                Counseling given: Not Answered      Vital Signs (Current):   Vitals:    10/15/24 0949 10/16/24 1108   BP:  106/79   Pulse:  64   Resp:  13   Temp:  98.1 °F (36.7 °C)   TempSrc:  Oral   SpO2:  98%   Weight: 60.8 kg (134 lb) 60.8 kg (134 lb 0.6 oz)   Height: 1.626 m (5' 4\") 1.626 m (5' 4\")                                              BP Readings from Last 3 Encounters:   10/16/24 106/79

## 2024-10-16 NOTE — ANESTHESIA POSTPROCEDURE EVALUATION
Department of Anesthesiology  Postprocedure Note    Patient: Roxi Rios  MRN: [id number]  YOB: 2005  Date of evaluation: 10/16/2024    Procedure Summary       Date: 10/16/24 Room / Location: [facility] ASU OR  / [facility] AMBULATORY OR    Anesthesia Start: 1213 Anesthesia Stop: 1256    Procedure: LEFT BREAST EXCISIONAL BIOPSY (Left: Breast) Diagnosis:       Left breast mass      (Left breast mass [N63.20])    Surgeons: Lisa Krishnan MD Responsible Provider: Adenike Cartagena MD    Anesthesia Type: MAC ASA Status: 2            Anesthesia Type: MAC    Angela Phase I: Angela Score: 8    Angela Phase II: Angela Score: 10    Anesthesia Post Evaluation    Patient location during evaluation: PACU  Patient participation: complete - patient participated  Level of consciousness: awake  Airway patency: patent  Nausea & Vomiting: no nausea  Cardiovascular status: hemodynamically stable  Respiratory status: acceptable  Hydration status: euvolemic  Pain management: adequate        No notable events documented.

## 2024-10-16 NOTE — INTERVAL H&P NOTE
Update History & Physical    The patient's History and Physical of October 1, 2024 was reviewed with the patient and I examined the patient. There was no change. The surgical site was confirmed by the patient and me.     Plan: The risks, benefits, expected outcome, and alternative to the recommended procedure have been discussed with the patient. Patient understands and wants to proceed with the procedure.     Electronically signed by Lisa Krishnan MD on 10/16/2024 at 10:28 AM

## 2024-10-18 ENCOUNTER — TELEPHONE (OUTPATIENT)
Age: 19
End: 2024-10-18

## 2024-10-18 NOTE — TELEPHONE ENCOUNTER
POD#2  FINAL PATHOLOGIC DIAGNOSIS     Breast, left, excision:        Fibroadenoma, present at surgical margin.     Swollen and sore  If symptoms do not improve, follow up with me next week

## 2024-11-07 ENCOUNTER — TELEPHONE (OUTPATIENT)
Facility: CLINIC | Age: 19
End: 2024-11-07

## 2024-11-07 NOTE — TELEPHONE ENCOUNTER
Lab orders placed.     Please call pt, and ask that female have labs drawn prior to scheduled appt, so results can be discussed at that time.

## 2024-11-26 ENCOUNTER — TELEMEDICINE (OUTPATIENT)
Facility: CLINIC | Age: 19
End: 2024-11-26
Payer: COMMERCIAL

## 2024-11-26 DIAGNOSIS — F33.0 MDD (MAJOR DEPRESSIVE DISORDER), RECURRENT EPISODE, MILD (HCC): ICD-10-CM

## 2024-11-26 DIAGNOSIS — E89.0 POSTPROCEDURAL HYPOTHYROIDISM: Primary | ICD-10-CM

## 2024-11-26 DIAGNOSIS — F39 MOOD DISORDER (HCC): ICD-10-CM

## 2024-11-26 LAB
BUN SERPL-MCNC: 14 MG/DL (ref 6–20)
BUN/CREAT SERPL: 16 (ref 9–23)
CALCIUM SERPL-MCNC: 9 MG/DL (ref 8.7–10.2)
CHLORIDE SERPL-SCNC: 103 MMOL/L (ref 96–106)
CO2 SERPL-SCNC: 21 MMOL/L (ref 20–29)
CREAT SERPL-MCNC: 0.89 MG/DL (ref 0.57–1)
EGFRCR SERPLBLD CKD-EPI 2021: 95 ML/MIN/1.73
GLUCOSE SERPL-MCNC: 83 MG/DL (ref 70–99)
POTASSIUM SERPL-SCNC: 4.4 MMOL/L (ref 3.5–5.2)
SODIUM SERPL-SCNC: 140 MMOL/L (ref 134–144)
TSH SERPL DL<=0.005 MIU/L-ACNC: 192 UIU/ML (ref 0.45–4.5)

## 2024-11-26 PROCEDURE — 99213 OFFICE O/P EST LOW 20 MIN: CPT | Performed by: FAMILY MEDICINE

## 2024-11-26 RX ORDER — LEVOTHYROXINE SODIUM 137 UG/1
125 TABLET ORAL
Qty: 30 TABLET | Refills: 1 | Status: SHIPPED | OUTPATIENT
Start: 2024-11-26

## 2024-11-26 ASSESSMENT — PATIENT HEALTH QUESTIONNAIRE - PHQ9
10. IF YOU CHECKED OFF ANY PROBLEMS, HOW DIFFICULT HAVE THESE PROBLEMS MADE IT FOR YOU TO DO YOUR WORK, TAKE CARE OF THINGS AT HOME, OR GET ALONG WITH OTHER PEOPLE: NOT DIFFICULT AT ALL
2. FEELING DOWN, DEPRESSED OR HOPELESS: NOT AT ALL
1. LITTLE INTEREST OR PLEASURE IN DOING THINGS: NOT AT ALL
SUM OF ALL RESPONSES TO PHQ9 QUESTIONS 1 & 2: 0
4. FEELING TIRED OR HAVING LITTLE ENERGY: NOT AT ALL
SUM OF ALL RESPONSES TO PHQ QUESTIONS 1-9: 0
3. TROUBLE FALLING OR STAYING ASLEEP: NOT AT ALL
6. FEELING BAD ABOUT YOURSELF - OR THAT YOU ARE A FAILURE OR HAVE LET YOURSELF OR YOUR FAMILY DOWN: NOT AT ALL
SUM OF ALL RESPONSES TO PHQ QUESTIONS 1-9: 0
7. TROUBLE CONCENTRATING ON THINGS, SUCH AS READING THE NEWSPAPER OR WATCHING TELEVISION: NOT AT ALL
8. MOVING OR SPEAKING SO SLOWLY THAT OTHER PEOPLE COULD HAVE NOTICED. OR THE OPPOSITE, BEING SO FIGETY OR RESTLESS THAT YOU HAVE BEEN MOVING AROUND A LOT MORE THAN USUAL: NOT AT ALL
5. POOR APPETITE OR OVEREATING: NOT AT ALL
9. THOUGHTS THAT YOU WOULD BE BETTER OFF DEAD, OR OF HURTING YOURSELF: NOT AT ALL
SUM OF ALL RESPONSES TO PHQ QUESTIONS 1-9: 0
SUM OF ALL RESPONSES TO PHQ QUESTIONS 1-9: 0

## 2024-11-26 NOTE — PATIENT INSTRUCTIONS
Thyroid hormones play a crucial role in maintaining homeostasis throughout the human body. Hypothyroidism is a result of insufficient circulating levels of thyroid hormone. In a hypothyroid state, not only do all metabolic processes tend to slow down but so do neurological processes.      Some characteristic appearance seen in these patients includes dry, coarse, and brittle head and body hair, which can result in total or partial loss of hair. Patients may feel cold due to reflex vasoconstriction caused by a decreased metabolic state.     Another complication seen in these patients is myxedema -  a severe form of hypothyroidism that can cause skin swelling, drowsiness, confusion, and even coma.    Patients with hypothyroidism can often have respiratory complications. This same mechanism can impact the heart, with a decreased heart rate.

## 2024-11-26 NOTE — PROGRESS NOTES
Chief Complaint   Patient presents with    Discuss Labs     \"Have you been to the ER, urgent care clinic since your last visit?  Hospitalized since your last visit?\"    NO    “Have you seen or consulted any other health care providers outside of [facility] since your last visit?”    NO            Click Here for Release of Records Request    
differently: Take 1.5 tablets by mouth Daily with lunch) 90 tablet 3    levothyroxine (SYNTHROID) 137 MCG tablet Take 1 tablet by mouth every morning (before breakfast) 30 tablet 0     No current facility-administered medications for this visit.       PHYSICAL EXAMINATION:  Patient-Reported Weight: 134lb  Patient-Reported Height: 5'4       Constitutional: [x] Appears well-developed and well-nourished [x] No apparent distress      [] Abnormal -     Mental status: [x] Alert and awake  [x] Oriented to person/place/time [x] Able to follow commands    [] Abnormal -     Eyes:   EOM    [x]  Normal    [] Abnormal -   Sclera  [x]  Normal    [] Abnormal -          Discharge [x]  None visible   [] Abnormal -     HENT: [x] Normocephalic, atraumatic  [] Abnormal -   [x] Mouth/Throat: Mucous membranes are moist    External Ears [x] Normal  [] Abnormal -    Neck: [x] No visualized mass [] Abnormal -     Pulmonary/Chest: [x] Respiratory effort normal   [x] No visualized signs of difficulty breathing or respiratory distress        [] Abnormal -      Musculoskeletal:   [x] Normal gait with no signs of ataxia         [x] Normal range of motion of neck        [] Abnormal -     Neurological:        [x] No Facial Asymmetry (Cranial nerve 7 motor function) (limited exam due to video visit)          [x] No gaze palsy        [] Abnormal -          Skin:        [x] No significant exanthematous lesions or discoloration noted on facial skin         [] Abnormal -            Psychiatric:       [x] Normal Affect [] Abnormal -        [x] No Hallucinations    Other pertinent observable physical exam findings:-    Results         I have discussed the diagnosis with the patient and the intended plan as seen in the above orders.  The patient understands and agrees with the plan.      Medication Side Effects and Warnings were discussed with patient  Patient Labs were reviewed and or requested:  Patient Past Records were reviewed and or requested    The

## 2025-01-07 DIAGNOSIS — E89.0 POSTPROCEDURAL HYPOTHYROIDISM: ICD-10-CM

## 2025-01-08 NOTE — PROGRESS NOTES
Roxi Rios, was evaluated through a synchronous (real-time) audio-video encounter. The patient (or guardian if applicable) is aware that this is a billable service, which includes applicable co-pays. This Virtual Visit was conducted with patient's (and/or legal guardian's) consent. Patient identification was verified, and a caregiver was present when appropriate.   The patient was located at Home: [id number] [facility] [address]  Provider was located at Facility (Appt Dept): [id number] [facility]  [address]  [place] [address]  Confirm you are appropriately licensed, registered, or certified to deliver care in the state where the patient is located as indicated above. If you are not or unsure, please re-schedule the visit: Yes, I confirm.     Roxi Rios (: 2005) is a Established patient, presenting virtually for evaluation of the following:    ASSESSMENT & PLAN:  Below is the assessment and plan developed based on review of pertinent history, physical exam, labs, studies, and medications.  Assessment & Plan      Assessment & Plan  Severe episode of recurrent major depressive disorder, without psychotic features (HCC)   Chronic, worsening (exacerbation), changes made today: urged intensive outpatient program. Pt contracts for safety, denies plan for self harm but tells me she will tell a friend if she has thoughts, agrees to set up appt with her counselor and send [employer] message today with appointment date, medication adherence emphasized, and lifestyle modifications recommended       Mood disorder (HCC)    Poor control d/t compliance, pt's mom is aware, continue with psychiatry, recommending intensive outpatient         Postprocedural hypothyroidism   Chronic, worsening (exacerbation), changes made today: labs are ready for recheck, strongly urged compliance and again reviewed risks of non-adherence, medication adherence emphasized, and lifestyle modifications

## 2025-01-10 ENCOUNTER — TELEMEDICINE (OUTPATIENT)
Facility: CLINIC | Age: 20
End: 2025-01-10
Payer: COMMERCIAL

## 2025-01-10 DIAGNOSIS — F39 MOOD DISORDER (HCC): ICD-10-CM

## 2025-01-10 DIAGNOSIS — E89.0 POSTPROCEDURAL HYPOTHYROIDISM: ICD-10-CM

## 2025-01-10 DIAGNOSIS — F33.2 SEVERE EPISODE OF RECURRENT MAJOR DEPRESSIVE DISORDER, WITHOUT PSYCHOTIC FEATURES (HCC): Primary | ICD-10-CM

## 2025-01-10 PROCEDURE — 99213 OFFICE O/P EST LOW 20 MIN: CPT | Performed by: FAMILY MEDICINE

## 2025-01-10 RX ORDER — BUPROPION HYDROCHLORIDE 100 MG/1
100 TABLET, EXTENDED RELEASE ORAL EVERY MORNING
COMMUNITY
Start: 2024-12-10

## 2025-01-10 SDOH — ECONOMIC STABILITY: FOOD INSECURITY: WITHIN THE PAST 12 MONTHS, YOU WORRIED THAT YOUR FOOD WOULD RUN OUT BEFORE YOU GOT MONEY TO BUY MORE.: NEVER TRUE

## 2025-01-10 SDOH — ECONOMIC STABILITY: FOOD INSECURITY: WITHIN THE PAST 12 MONTHS, THE FOOD YOU BOUGHT JUST DIDN'T LAST AND YOU DIDN'T HAVE MONEY TO GET MORE.: NEVER TRUE

## 2025-01-10 ASSESSMENT — COLUMBIA-SUICIDE SEVERITY RATING SCALE - C-SSRS
2. HAVE YOU ACTUALLY HAD ANY THOUGHTS OF KILLING YOURSELF?: NO
1. WITHIN THE PAST MONTH, HAVE YOU WISHED YOU WERE DEAD OR WISHED YOU COULD GO TO SLEEP AND NOT WAKE UP?: NO
6. HAVE YOU EVER DONE ANYTHING, STARTED TO DO ANYTHING, OR PREPARED TO DO ANYTHING TO END YOUR LIFE?: NO

## 2025-01-10 ASSESSMENT — PATIENT HEALTH QUESTIONNAIRE - PHQ9
9. THOUGHTS THAT YOU WOULD BE BETTER OFF DEAD, OR OF HURTING YOURSELF: SEVERAL DAYS
SUM OF ALL RESPONSES TO PHQ9 QUESTIONS 1 & 2: 4
8. MOVING OR SPEAKING SO SLOWLY THAT OTHER PEOPLE COULD HAVE NOTICED. OR THE OPPOSITE, BEING SO FIGETY OR RESTLESS THAT YOU HAVE BEEN MOVING AROUND A LOT MORE THAN USUAL: SEVERAL DAYS
4. FEELING TIRED OR HAVING LITTLE ENERGY: MORE THAN HALF THE DAYS
5. POOR APPETITE OR OVEREATING: SEVERAL DAYS
SUM OF ALL RESPONSES TO PHQ QUESTIONS 1-9: 15
1. LITTLE INTEREST OR PLEASURE IN DOING THINGS: MORE THAN HALF THE DAYS
SUM OF ALL RESPONSES TO PHQ QUESTIONS 1-9: 16
7. TROUBLE CONCENTRATING ON THINGS, SUCH AS READING THE NEWSPAPER OR WATCHING TELEVISION: MORE THAN HALF THE DAYS
SUM OF ALL RESPONSES TO PHQ QUESTIONS 1-9: 16
10. IF YOU CHECKED OFF ANY PROBLEMS, HOW DIFFICULT HAVE THESE PROBLEMS MADE IT FOR YOU TO DO YOUR WORK, TAKE CARE OF THINGS AT HOME, OR GET ALONG WITH OTHER PEOPLE: VERY DIFFICULT
6. FEELING BAD ABOUT YOURSELF - OR THAT YOU ARE A FAILURE OR HAVE LET YOURSELF OR YOUR FAMILY DOWN: MORE THAN HALF THE DAYS
SUM OF ALL RESPONSES TO PHQ QUESTIONS 1-9: 16
2. FEELING DOWN, DEPRESSED OR HOPELESS: MORE THAN HALF THE DAYS
3. TROUBLE FALLING OR STAYING ASLEEP: NEARLY EVERY DAY

## 2025-01-10 NOTE — PATIENT INSTRUCTIONS
Today we discussed:  Please take your meds!  Let's get your thyroid rechecked. Your order is ready.  Please call your counselor for an appointment OR consider intensive outpatient counseling:  [facility]  Services offered Monday - Friday 8:45 am - 2:45 pm  Time treatment ranges between 2 and 12 weeks  Call [phone]    Thank you and great to see you today!   Please reach out on [facility] with any questions.   Torrie Mcdowell, APRN - CNP

## 2025-01-10 NOTE — PROGRESS NOTES
Chief Complaint   Patient presents with    Hypothyroidism     Roxi Rios is a 19 y.o. female who presents for a follow up on hypothyroidism.      \"Have you been to the ER, urgent care clinic since your last visit?  Hospitalized since your last visit?\"    NO    “Have you seen or consulted any other health care providers outside of [facility] since your last visit?”    NO            Click Here for Release of Records Request

## 2025-01-13 ENCOUNTER — PATIENT MESSAGE (OUTPATIENT)
Facility: CLINIC | Age: 20
End: 2025-01-13

## 2025-01-13 DIAGNOSIS — E89.0 POSTPROCEDURAL HYPOTHYROIDISM: Primary | ICD-10-CM

## 2025-01-31 ENCOUNTER — TELEPHONE (OUTPATIENT)
Facility: CLINIC | Age: 20
End: 2025-01-31

## 2025-01-31 NOTE — TELEPHONE ENCOUNTER
Eileen called from rheumatologist, Dr. Sushma Kumari's office to confirm reason pt is being referred due to diagnosis on referral form received of post-procedural hypothyroidism, stating they don't see pts for this.     Referral to endocrinologist in chart for same diagnosis.  Please advise at [phone] if pt needs to be seen for anything else. Sukhdev

## 2025-02-14 ENCOUNTER — TELEMEDICINE (OUTPATIENT)
Facility: CLINIC | Age: 20
End: 2025-02-14
Payer: COMMERCIAL

## 2025-02-14 DIAGNOSIS — F33.2 SEVERE EPISODE OF RECURRENT MAJOR DEPRESSIVE DISORDER, WITHOUT PSYCHOTIC FEATURES (HCC): Primary | ICD-10-CM

## 2025-02-14 DIAGNOSIS — E89.0 POSTPROCEDURAL HYPOTHYROIDISM: ICD-10-CM

## 2025-02-14 PROCEDURE — 99213 OFFICE O/P EST LOW 20 MIN: CPT | Performed by: FAMILY MEDICINE

## 2025-02-14 ASSESSMENT — COLUMBIA-SUICIDE SEVERITY RATING SCALE - C-SSRS
2. HAVE YOU ACTUALLY HAD ANY THOUGHTS OF KILLING YOURSELF?: NO
6. HAVE YOU EVER DONE ANYTHING, STARTED TO DO ANYTHING, OR PREPARED TO DO ANYTHING TO END YOUR LIFE?: NO
1. WITHIN THE PAST MONTH, HAVE YOU WISHED YOU WERE DEAD OR WISHED YOU COULD GO TO SLEEP AND NOT WAKE UP?: YES

## 2025-02-14 ASSESSMENT — PATIENT HEALTH QUESTIONNAIRE - PHQ9
6. FEELING BAD ABOUT YOURSELF - OR THAT YOU ARE A FAILURE OR HAVE LET YOURSELF OR YOUR FAMILY DOWN: SEVERAL DAYS
7. TROUBLE CONCENTRATING ON THINGS, SUCH AS READING THE NEWSPAPER OR WATCHING TELEVISION: SEVERAL DAYS
SUM OF ALL RESPONSES TO PHQ QUESTIONS 1-9: 17
9. THOUGHTS THAT YOU WOULD BE BETTER OFF DEAD, OR OF HURTING YOURSELF: SEVERAL DAYS
4. FEELING TIRED OR HAVING LITTLE ENERGY: MORE THAN HALF THE DAYS
SUM OF ALL RESPONSES TO PHQ9 QUESTIONS 1 & 2: 6
SUM OF ALL RESPONSES TO PHQ QUESTIONS 1-9: 17
3. TROUBLE FALLING OR STAYING ASLEEP: MORE THAN HALF THE DAYS
1. LITTLE INTEREST OR PLEASURE IN DOING THINGS: NEARLY EVERY DAY
SUM OF ALL RESPONSES TO PHQ QUESTIONS 1-9: 16
2. FEELING DOWN, DEPRESSED OR HOPELESS: NEARLY EVERY DAY
10. IF YOU CHECKED OFF ANY PROBLEMS, HOW DIFFICULT HAVE THESE PROBLEMS MADE IT FOR YOU TO DO YOUR WORK, TAKE CARE OF THINGS AT HOME, OR GET ALONG WITH OTHER PEOPLE: EXTREMELY DIFFICULT
5. POOR APPETITE OR OVEREATING: NEARLY EVERY DAY
SUM OF ALL RESPONSES TO PHQ QUESTIONS 1-9: 17
8. MOVING OR SPEAKING SO SLOWLY THAT OTHER PEOPLE COULD HAVE NOTICED. OR THE OPPOSITE, BEING SO FIGETY OR RESTLESS THAT YOU HAVE BEEN MOVING AROUND A LOT MORE THAN USUAL: SEVERAL DAYS

## 2025-02-14 NOTE — PATIENT INSTRUCTIONS
The 24/7 [facility] Crisis line ([phone]) is available to assist with mental health emergencies occurring in the [facility]. If you, or someone you know, is having thoughts of suicide, feelings of hopelessness or experiencing any other psychiatric emergency, please call! They can assist in discussing ways to stay safe and/or can link individuals to outpatient treatment, crisis stabilization programs, or psychiatric hospitalization as needed.   All crisis calls are free. If you have a mental health emergency, please call [phone].   Regular client appointments and intakes for new clients are being conducted by telephone or through tele-health options. Individuals wishing to begin outpatient counseling services through MH can the Intake number at [phone].  -----------  Crisis Response and Stabilization Team (CReST) Referral Line [phone]  CReST is a resource for children ages 5 - 18 or adults ages 18 & older who are in crisis and need help avoiding psychiatric hospitalization.  With quick response and assistance connecting to on-going services, CReST works to reduce the cycles of crises and prevent the need for more intense care.  -----------  [facility] CRISIS & REFERRAL LINE - 1-191.873.5065  REACH is a program to support individuals with developmental disabilities who are at risk of crisis due to challenging behavioral health needs which are negatively affecting their quality of life.  -----------  [employer] Hotline: 1-989.385.8524  The [employer] is the leading national organization providing crisis intervention and suicide prevention services to lesbian, tobias, bisexual, transgender, queer & questioning (LGBTQ) young people under 25.  ?  ASIYA: Text “ASIYA” to 826138 if you are having suicidal thoughts or urges. You can reach out by text.  ?  Suicide Prevention Lifeline: (701) 273-TALK or (029) SUICIDE  ?  [facility]: 122-910-NRKD (9979) - peer run [place]

## 2025-02-14 NOTE — PROGRESS NOTES
Roxi Rios, was evaluated through a synchronous (real-time) audio-video encounter. The patient (or guardian if applicable) is aware that this is a billable service, which includes applicable co-pays. This Virtual Visit was conducted with patient's (and/or legal guardian's) consent. Patient identification was verified, and a caregiver was present when appropriate.   The patient was located at Home: [id number] [address] [facility] [address] [facility] [address]  Provider was located at Facility (Appt Dept): [id number] [facility]  [address]  [place] [address]  Confirm you are appropriately licensed, registered, or certified to deliver care in the state where the patient is located as indicated above. If you are not or unsure, please re-schedule the visit: Yes, I confirm.     Roxi Rios (: 2005) is a Established patient, presenting virtually for evaluation of the following:    ASSESSMENT & PLAN:  Below is the assessment and plan developed based on review of pertinent history, physical exam, labs, studies, and medications.  Assessment & Plan  1. Depression: Reports occasional self-harm thoughts, especially when upset or mad, but has not acted on them. Inconsistent with psychiatric medications, including Lexapro. Stays up all night playing games, has low energy.  - Advised to take psychiatric medications regularly  - Encouraged to seek counseling at [facility] or [facility], offering virtual and in-person sessions  - Outpatient hospitalization remains an option if needed  - Contracts for safety    2. Thyroid management: Taking thyroid medication but missed blood work.  - Instructed to complete blood work to monitor thyroid levels    Follow-up  - Check instructions for lab work and counseling appointments  - Consider outpatient hospitalization option      Return in about 4 weeks (around 3/14/2025) for virtual visit, after labs, Depression/Anxiety, Hypothyroid.    Patient Instructions

## 2025-02-14 NOTE — PROGRESS NOTES
Chief Complaint   Patient presents with    Depression     Roxi Rios is a 19 y.o. female who presents for a follow up on depression.      \"Have you been to the ER, urgent care clinic since your last visit?  Hospitalized since your last visit?\"    NO    “Have you seen or consulted any other health care providers outside of [facility] since your last visit?”    NO            Click Here for Release of Records Request

## (undated) DEVICE — TOWEL SURG W17XL27IN STD BLU COT NONFENESTRATED PREWASHED

## (undated) DEVICE — BASIC GENERAL-SFMC: Brand: MEDLINE INDUSTRIES, INC.

## (undated) DEVICE — CLIP INT SM WIDE RED TI TRNSVRS GRV CHEVRON SHP W/ PRECIS

## (undated) DEVICE — PENCIL SMK EVAC L10FT DIA95MM TBNG NONSTICK W ADPT TO 22MM

## (undated) DEVICE — CLIP LIG M BLU TI HRT SHP WIRE HORZ 180 PER BX

## (undated) DEVICE — GLOVE SURG SZ 65 THK91MIL LTX FREE SYN POLYISOPRENE

## (undated) DEVICE — SPONGE: SPECIALTY PEANUT XR 100/CS: Brand: MEDICAL ACTION INDUSTRIES

## (undated) DEVICE — SUTURE VCRL SZ 3-0 L27IN ABSRB UD L26MM SH 1/2 CIR J416H

## (undated) DEVICE — BLANKET WRM W35.4XL86.6IN FULL UNDERBODY + FORC AIR

## (undated) DEVICE — SUTURE PROL SZ 4-0 L18IN NONABSORBABLE BLU L13MM P-3 3/8 8699G

## (undated) DEVICE — PROBE 8225101 5PK STD PRASS FL TIP ROHS

## (undated) DEVICE — SPONGE GZ W4XL4IN COT RADPQ HIGHLY ABSRB

## (undated) DEVICE — DERMABOND SKIN ADH 0.7ML -- DERMABOND ADVANCED 12/BX

## (undated) DEVICE — CHEST PACK-SFMCASU: Brand: MEDLINE INDUSTRIES, INC.

## (undated) DEVICE — DEVICE TRNSF SPIK STL 2008S] MICROTEK MEDICAL INC]

## (undated) DEVICE — SUTURE PERMAHAND SZ 3-0 L18IN NONABSORBABLE BLK L26MM SH C013D

## (undated) DEVICE — SUTURE PERMAHAND SZ 3-0 L30IN NONABSORBABLE BLK SILK BRAID A304H

## (undated) DEVICE — COVER US PRB W15XL120CM W/ GEL RUBBERBAND TAPE STRP FLD GEN

## (undated) DEVICE — AGENT HEMSTAT W4XL4IN OXIDIZED REGENERATED CELOS ABSRB SFT

## (undated) DEVICE — SUTURE VCRL SZ 3-0 L18IN ABSRB UD L26MM SH 1/2 CIR J864D

## (undated) DEVICE — CANISTER, RIGID, 3000CC: Brand: MEDLINE INDUSTRIES, INC.

## (undated) DEVICE — GLOVE ORANGE PI 7   MSG9070

## (undated) DEVICE — SHEAR RMFG HARMONIC FOCUS 9CM -- OEM ITEM L#322125

## (undated) DEVICE — SOLUTION IRRIG 1000ML STRL H2O USP PLAS POUR BTL

## (undated) DEVICE — SOL IRRIGATION INJ NACL 0.9% 500ML BTL

## (undated) DEVICE — HYPODERMIC SAFETY NEEDLE: Brand: MONOJECT

## (undated) DEVICE — INSULATED BLADE ELECTRODE: Brand: EDGE

## (undated) DEVICE — STRIP,CLOSURE,WOUND,MEDI-STRIP,1/2X4: Brand: MEDLINE

## (undated) DEVICE — GLOVE ORANGE PI 7 1/2   MSG9075

## (undated) DEVICE — MAGNETIC INSTR DRAPE 20X16: Brand: MEDLINE INDUSTRIES, INC.

## (undated) DEVICE — PACK,EENT,TURBAN DRAPE,PK II: Brand: MEDLINE